# Patient Record
Sex: FEMALE | Race: WHITE | NOT HISPANIC OR LATINO | Employment: OTHER | URBAN - METROPOLITAN AREA
[De-identification: names, ages, dates, MRNs, and addresses within clinical notes are randomized per-mention and may not be internally consistent; named-entity substitution may affect disease eponyms.]

---

## 2023-04-17 ENCOUNTER — OFFICE VISIT (OUTPATIENT)
Dept: URGENT CARE | Facility: CLINIC | Age: 68
End: 2023-04-17

## 2023-04-17 VITALS
HEIGHT: 63 IN | SYSTOLIC BLOOD PRESSURE: 108 MMHG | HEART RATE: 84 BPM | DIASTOLIC BLOOD PRESSURE: 44 MMHG | TEMPERATURE: 97 F | OXYGEN SATURATION: 99 % | BODY MASS INDEX: 50.5 KG/M2 | WEIGHT: 285 LBS

## 2023-04-17 DIAGNOSIS — S81.811A LACERATION OF RIGHT LOWER LEG, INITIAL ENCOUNTER: Primary | ICD-10-CM

## 2023-04-17 PROCEDURE — 99204 PR OFFICE/OUTPT VISIT, NEW, LEVL IV, 45-59 MIN: ICD-10-PCS | Mod: TIER,25,S$GLB, | Performed by: PHYSICIAN ASSISTANT

## 2023-04-17 PROCEDURE — 12032 INTMD RPR S/A/T/EXT 2.6-7.5: CPT | Mod: TIER,S$GLB,, | Performed by: PHYSICIAN ASSISTANT

## 2023-04-17 PROCEDURE — 99204 OFFICE O/P NEW MOD 45 MIN: CPT | Mod: TIER,25,S$GLB, | Performed by: PHYSICIAN ASSISTANT

## 2023-04-17 PROCEDURE — 12032 LACERATION REPAIR: ICD-10-PCS | Mod: TIER,S$GLB,, | Performed by: PHYSICIAN ASSISTANT

## 2023-04-17 RX ORDER — DOXYCYCLINE HYCLATE 100 MG
100 TABLET ORAL 2 TIMES DAILY
Qty: 14 TABLET | Refills: 0 | Status: SHIPPED | OUTPATIENT
Start: 2023-04-17 | End: 2023-04-24

## 2023-04-17 RX ORDER — TOPIRAMATE 100 MG/1
100 TABLET, FILM COATED ORAL 2 TIMES DAILY
COMMUNITY
Start: 2023-04-03

## 2023-04-17 RX ORDER — CEPHALEXIN 500 MG/1
500 CAPSULE ORAL 4 TIMES DAILY
COMMUNITY
End: 2023-04-17

## 2023-04-17 RX ORDER — VENLAFAXINE HYDROCHLORIDE 150 MG/1
300 CAPSULE, EXTENDED RELEASE ORAL
COMMUNITY
Start: 2023-04-03

## 2023-04-17 RX ORDER — ASPIRIN 81 MG/1
81 TABLET ORAL DAILY
COMMUNITY

## 2023-04-17 RX ORDER — FUROSEMIDE 20 MG/1
20 TABLET ORAL
COMMUNITY
Start: 2023-04-03

## 2023-04-17 RX ORDER — TRAZODONE HYDROCHLORIDE 100 MG/1
100 TABLET ORAL NIGHTLY
COMMUNITY
Start: 2023-04-03

## 2023-04-17 RX ORDER — ESCITALOPRAM OXALATE 5 MG/1
5 TABLET ORAL
COMMUNITY
Start: 2023-04-03

## 2023-04-17 RX ORDER — DICLOFENAC SODIUM 1 MG/ML
1 SOLUTION/ DROPS OPHTHALMIC 4 TIMES DAILY
COMMUNITY

## 2023-04-17 RX ORDER — HYDROCODONE BITARTRATE AND ACETAMINOPHEN 10; 325 MG/1; MG/1
1 TABLET ORAL
COMMUNITY

## 2023-04-17 RX ORDER — LISINOPRIL 5 MG/1
5 TABLET ORAL
COMMUNITY
Start: 2023-04-03

## 2023-04-17 RX ORDER — LEVOTHYROXINE SODIUM 150 UG/1
150 TABLET ORAL
COMMUNITY
Start: 2023-04-03

## 2023-04-17 NOTE — PROGRESS NOTES
"Subjective:      Patient ID: Ronit Hopkins is a 67 y.o. female.    Vitals:  height is 5' 3" (1.6 m) and weight is 129.3 kg (285 lb). Her temperature is 97.1 °F (36.2 °C). Her blood pressure is 108/44 (abnormal) and her pulse is 84. Her oxygen saturation is 99%.     Chief Complaint: Laceration    Pt has a laceration on right lower leg. Incident occurred last night. Pt states laceration has white discharge.  Patient is unsure what she cut leg.  She has a history of neuropathy and diabetes.  Denies any pain.   has been keeping the wound clean and bandage but can not get it to stop leaking.  Patient states that she is had her tetanus vaccine in the past 5 years.  Not available in immunization history.    Laceration   The incident occurred 12 to 24 hours ago. The laceration is located on the Left leg. The laceration is 3 cm in size. The laceration mechanism is unknown.The pain is at a severity of 0/10. The patient is experiencing no pain. She reports no foreign bodies present. Her tetanus status is UTD (pt states has been in the past 5 years unknown date).     Constitution: Negative for chills, sweating, fatigue, fever and unexpected weight change.   Cardiovascular:  Negative for chest pain.   Respiratory:  Negative for shortness of breath.    Musculoskeletal:  Positive for trauma. Negative for pain, joint pain, joint swelling, abnormal ROM of joint, muscle cramps and muscle ache.   Skin:  Positive for wound, laceration and erythema. Negative for rash, bruising, abscess and avulsion.   Neurological:  Negative for disorientation, altered mental status, numbness and tingling.   Psychiatric/Behavioral:  Negative for altered mental status, disorientation and confusion.     History reviewed. No pertinent past medical history.    History reviewed. No pertinent surgical history.    History reviewed. No pertinent family history.    Social History     Socioeconomic History    Marital status:    Tobacco Use "    Smoking status: Never    Smokeless tobacco: Never   Substance and Sexual Activity    Alcohol use: Yes    Drug use: Not Currently       Current Outpatient Medications   Medication Sig Dispense Refill    aspirin (ECOTRIN) 81 MG EC tablet Take 81 mg by mouth once daily.      diclofenac (VOLTAREN) 0.1 % ophthalmic solution 1 drop 4 (four) times daily.      EScitalopram oxalate (LEXAPRO) 5 MG Tab Take 5 mg by mouth.      furosemide (LASIX) 20 MG tablet Take 20 mg by mouth.      HYDROcodone-acetaminophen (NORCO)  mg per tablet Take 1 tablet by mouth.      insulin NPH-insulin regular, 70/30, 100 unit/mL (70-30) injection Inject into the skin 2 (two) times daily.      levothyroxine (SYNTHROID) 150 MCG tablet Take 150 mcg by mouth.      lisinopriL (PRINIVIL,ZESTRIL) 5 MG tablet Take 5 mg by mouth.      topiramate (TOPAMAX) 100 MG tablet Take 100 mg by mouth 2 (two) times daily.      traZODone (DESYREL) 100 MG tablet Take 100 mg by mouth every evening.      venlafaxine (EFFEXOR-XR) 150 MG Cp24 Take 300 mg by mouth.       No current facility-administered medications for this visit.       Review of patient's allergies indicates:   Allergen Reactions    Sulfa (sulfonamide antibiotics)        Objective:     Physical Exam   Constitutional: She is oriented to person, place, and time.  Non-toxic appearance. She does not appear ill. No distress. obesity  HENT:   Head: Normocephalic and atraumatic.   Ears:   Right Ear: External ear normal.   Left Ear: External ear normal.   Nose: Nose normal.   Eyes: Conjunctivae are normal.   Cardiovascular: Normal rate, regular rhythm and normal pulses.   No murmur heard.Exam reveals no gallop.   Pulmonary/Chest: Effort normal and breath sounds normal. No stridor. No respiratory distress. She has no wheezes. She has no rhonchi. She has no rales.   Abdominal: Normal appearance.   Musculoskeletal: Normal range of motion.         General: No swelling, tenderness or signs of injury. Normal  range of motion.   Neurological: She is alert and oriented to person, place, and time. She displays no weakness.   Skin: Skin is warm, dry, not pale and no rash. Capillary refill takes less than 2 seconds. erythema No bruising        Psychiatric: Her behavior is normal. Mood, judgment and thought content normal.   Nursing note and vitals (pt in a wheelchair) reviewed.    Laceration Repair    Date/Time: 2023 6:30 PM  Performed by: Jeanne Hart PA-C  Authorized by: Jeanne Hart PA-C   Consent Done: Yes  Consent: Verbal consent obtained.  Risks and benefits: risks, benefits and alternatives were discussed  Consent given by: patient  Patient understanding: patient states understanding of the procedure being performed  Required items: required blood products, implants, devices, and special equipment available  Patient identity confirmed:  and name  Body area: lower extremity  Location details: right lower leg  Laceration length: 3 cm  Foreign bodies: no foreign bodies  Tendon involvement: none  Nerve involvement: none  Vascular damage: no  Anesthesia: see MAR for details (none)    Patient sedated: no  Preparation: Patient was prepped and draped in the usual sterile fashion.  Irrigation solution: saline  Irrigation method: syringe  Amount of cleaning: extensive  Debridement: none  Degree of undermining: none  Dressing: 4x4 sterile gauze (dermabond and keflix)  Patient tolerance: Patient tolerated the procedure well with no immediate complications          Assessment:     1. Laceration of right lower leg, initial encounter        Plan:       Laceration of right lower leg, initial encounter  -     doxycycline (VIBRA-TABS) 100 MG tablet; Take 1 tablet (100 mg total) by mouth 2 (two) times daily. for 7 days  Dispense: 14 tablet; Refill: 0      Patient Instructions   The wound is cleansed, debrided of foreign material as much as possible, and dressed. Be alert for any signs of infection (redness, pus,  pain, increased swelling or fever) and call if such occurs. Keep the wound clean and dry. You may remove the bandage to shower as usual after the first 24 hours to prevent crusting. After removing the bandage, wash the area with soap and water at least twice daily unless more frequently soiled, then clean more often.   After 48 hours, then leave open to air and do not bandage. Do not use neosporin/polysporin. You were prescribed doxycycline for a antimicrobial.  Make sure to take with food do not take with milk products.  Be aware sun exposure more prone to sunburns while on this medication. If dermbond (skin glue) was used do not place any topical antibiotics, lotions, creams or vasoline. The dermabond will fall off on its own in 7-10 days. Do not pull off. The glue keeps bacteria out of the wound which is why no antibiotics as needed to prevent an infection. All wounds will leave a scar. To prevent the scar from bleaching in the sun make sure to wear sunscreen in the sun after the wound has healed. Do not apply a great deal of tension or stress to the suture line. Tetanus vaccination status reviewed: up to date per patient.

## 2023-04-18 NOTE — PROCEDURES
Laceration Repair    Date/Time: 2023 6:30 PM  Performed by: Jeanne Hart PA-C  Authorized by: Jeanne Hart PA-C   Consent Done: Yes  Consent: Verbal consent obtained.  Risks and benefits: risks, benefits and alternatives were discussed  Consent given by: patient  Patient understanding: patient states understanding of the procedure being performed  Required items: required blood products, implants, devices, and special equipment available  Patient identity confirmed:  and name  Body area: lower extremity  Location details: right lower leg  Laceration length: 3 cm  Foreign bodies: no foreign bodies  Tendon involvement: none  Nerve involvement: none  Vascular damage: no  Anesthesia: see MAR for details (none)    Patient sedated: no  Preparation: Patient was prepped and draped in the usual sterile fashion.  Irrigation solution: saline  Irrigation method: syringe  Amount of cleaning: extensive  Debridement: none  Degree of undermining: none  Dressing: 4x4 sterile gauze (dermabond and keflix)  Patient tolerance: Patient tolerated the procedure well with no immediate complications

## 2023-04-18 NOTE — PATIENT INSTRUCTIONS
The wound is cleansed, debrided of foreign material as much as possible, and dressed. Be alert for any signs of infection (redness, pus, pain, increased swelling or fever) and call if such occurs. Keep the wound clean and dry. You may remove the bandage to shower as usual after the first 24 hours to prevent crusting. After removing the bandage, wash the area with soap and water at least twice daily unless more frequently soiled, then clean more often.   After 48 hours, then leave open to air and do not bandage. Do not use neosporin/polysporin. You were prescribed doxycycline for a antimicrobial.  Make sure to take with food do not take with milk products.  Be aware sun exposure more prone to sunburns while on this medication. If dermbond (skin glue) was used do not place any topical antibiotics, lotions, creams or vasoline. The dermabond will fall off on its own in 7-10 days. Do not pull off. The glue keeps bacteria out of the wound which is why no antibiotics as needed to prevent an infection. All wounds will leave a scar. To prevent the scar from bleaching in the sun make sure to wear sunscreen in the sun after the wound has healed. Do not apply a great deal of tension or stress to the suture line. Tetanus vaccination status reviewed: up to date per patient.